# Patient Record
Sex: MALE | Race: WHITE | NOT HISPANIC OR LATINO | Employment: FULL TIME | URBAN - METROPOLITAN AREA
[De-identification: names, ages, dates, MRNs, and addresses within clinical notes are randomized per-mention and may not be internally consistent; named-entity substitution may affect disease eponyms.]

---

## 2021-05-26 ENCOUNTER — RECORDS - HEALTHEAST (OUTPATIENT)
Dept: ADMINISTRATIVE | Facility: CLINIC | Age: 26
End: 2021-05-26

## 2021-05-31 ENCOUNTER — RECORDS - HEALTHEAST (OUTPATIENT)
Dept: ADMINISTRATIVE | Facility: CLINIC | Age: 26
End: 2021-05-31

## 2024-04-08 ENCOUNTER — NURSE TRIAGE (OUTPATIENT)
Dept: FAMILY MEDICINE | Facility: CLINIC | Age: 29
End: 2024-04-08
Payer: COMMERCIAL

## 2024-04-08 NOTE — TELEPHONE ENCOUNTER
"Patient called reporting he has an appointment scheduled at the clinic on 4/29/24 with Dr. Lucas for establish care.  Calling with concerns of sudden onset of sweating \"out of control\" for about 5-10 minutes, then blurred vision in both eyes, and \"spaced out for about 30 seconds and didn't know what was going on, completely out of it this morning ~ 5:45am; co-workers were trying to see if I was okay.\"  Symptoms subsided \"after I came to my senses, vision was fine, stopped sweating.\"  Hadn't started his work shift yet, starts at 6am.    Has not had anything like this before.  Reports a couple years ago when he had some blurred peripheral vision but no other symptoms.      States he was seen at the clinic at his work, had vitals checked but did not see a provider as they weren't in yet.  His BP was 155/80.  , no hx of DM.    Hadn't eaten since last night, this morning had coffee and water.     States he's not seen a doctor in about 4 years.    During episode, denied chest pain, SOB, HA.  No injuries.    Was able to work all day, , no change in work duties and no concerns currently as no symptoms.      RN reviewed care advice and advised if symptoms return to be seen in the ER.  Appointment scheduled for patient to be seen with Domenica Lozano CNP for acute visit on 4/11/24.  Patient verbalized understanding and agrees to be seen in the ER if symptoms return.      Re Castellanos RN  [facility]       Reason for Disposition   Brief (now gone) blurred vision and unexplained   [1] SEVERE sweating (e.g., drenched with sweat) AND [2] cause unknown    Additional Information   Negative: Weakness of the face, arm or leg on one side of the body   Negative: Followed getting substance in the eye   Negative: Foreign body stuck in the eye   Negative: Followed an eye injury   Negative: Followed sun lamp or sun exposure (UV keratitis)   Negative: Yellow or green discharge (pus) " in the eye   Negative: Pregnant   Negative: Complete loss of vision in one or both eyes   Negative: SEVERE eye pain   Negative: SEVERE headache   Negative: Double vision   Negative: Blurred vision or visual changes and present now and sudden onset or new (e.g., minutes, hours, days)  (Exception: Seeing floaters / black specks OR previously diagnosed migraine headaches with same symptoms.)   Negative: Patient sounds very sick or weak to the triager   Negative: Flashes of light  (Exception: Brief from pressing on the eyeball.)   Negative: Many floaters in the eye (Exception: Floater(s) are a chronic symptom and this is unchanged from patient's baseline pattern.)   Negative: Eye pain and brief (now gone) blurred vision or visual changes     No eye pain   Negative: Taking digoxin (e.g., Lanoxin, Digitek, Cardoxin, Lanoxicaps; Toloxin in [place]) and blurred vision, yellow vision, or yellow-green halos   Negative: Jaw pain while eating and age > 50 years   Negative: Headache and age > 50 years   Negative: Patient wants to be seen   Negative: Single floater (i.e., small speck seems to float across the eye)  (Exception: Floater(s) are a chronic symptom and this is unchanged from patient's baseline pattern.)   Negative: SEVERE difficulty breathing (e.g., struggling for each breath, speaks in single words)   Negative: Shock suspected (e.g., cold/pale/clammy skin, too weak to stand, low BP, rapid pulse)   Negative: Sounds like a life-threatening emergency to the triager   Negative: Fever   Negative: Chest pain or cardiac ischemia is suspected (e.g., unexplained visible sweat on face)   Negative: Weakness   Negative: Dizziness and lightheadedness (e.g., feeling woozy, feeling like they might faint)   Negative: Heat exhaustion suspected (i.e., dehydration from heat exposure)   Negative: [1] Diabetes mellitus AND [2] sweating from low blood glucose (i.e., 70 mg/dl [3.9 mmol/l] or below)   Negative: Difficulty breathing    Negative: Patient sounds very sick or weak to the triager    Protocols used: Vision Loss or Change-A-OH, [id number]-Y-AW

## 2024-04-11 ENCOUNTER — OFFICE VISIT (OUTPATIENT)
Dept: FAMILY MEDICINE | Facility: CLINIC | Age: 29
End: 2024-04-11
Payer: COMMERCIAL

## 2024-04-11 VITALS
HEART RATE: 81 BPM | SYSTOLIC BLOOD PRESSURE: 132 MMHG | RESPIRATION RATE: 16 BRPM | HEIGHT: 67 IN | OXYGEN SATURATION: 98 % | DIASTOLIC BLOOD PRESSURE: 83 MMHG | BODY MASS INDEX: 25.64 KG/M2 | TEMPERATURE: 99.2 F | WEIGHT: 163.38 LBS

## 2024-04-11 DIAGNOSIS — G45.4 TRANSIENT GLOBAL AMNESIA: Primary | ICD-10-CM

## 2024-04-11 LAB
ALBUMIN SERPL BCG-MCNC: 4.5 G/DL (ref 3.5–5.2)
ALP SERPL-CCNC: 36 U/L (ref 40–150)
ALT SERPL W P-5'-P-CCNC: 28 U/L (ref 0–70)
ANION GAP SERPL CALCULATED.3IONS-SCNC: 9 MMOL/L (ref 7–15)
AST SERPL W P-5'-P-CCNC: 20 U/L (ref 0–45)
BILIRUB SERPL-MCNC: 0.3 MG/DL
BUN SERPL-MCNC: 12.3 MG/DL (ref 6–20)
CALCIUM SERPL-MCNC: 9.2 MG/DL (ref 8.6–10)
CHLORIDE SERPL-SCNC: 107 MMOL/L (ref 98–107)
CREAT SERPL-MCNC: 0.8 MG/DL (ref 0.67–1.17)
DEPRECATED HCO3 PLAS-SCNC: 25 MMOL/L (ref 22–29)
EGFRCR SERPLBLD CKD-EPI 2021: >90 ML/MIN/1.73M2
ERYTHROCYTE [DISTWIDTH] IN BLOOD BY AUTOMATED COUNT: 13.5 % (ref 10–15)
GLUCOSE SERPL-MCNC: 100 MG/DL (ref 70–99)
HCT VFR BLD AUTO: 41.3 % (ref 40–53)
HGB BLD-MCNC: 13.7 G/DL (ref 13.3–17.7)
MCH RBC QN AUTO: 28.9 PG (ref 26.5–33)
MCHC RBC AUTO-ENTMCNC: 33.2 G/DL (ref 31.5–36.5)
MCV RBC AUTO: 87 FL (ref 78–100)
PLATELET # BLD AUTO: 205 10E3/UL (ref 150–450)
POTASSIUM SERPL-SCNC: 4.4 MMOL/L (ref 3.4–5.3)
PROT SERPL-MCNC: 6.8 G/DL (ref 6.4–8.3)
RBC # BLD AUTO: 4.74 10E6/UL (ref 4.4–5.9)
SODIUM SERPL-SCNC: 141 MMOL/L (ref 135–145)
TSH SERPL DL<=0.005 MIU/L-ACNC: 1.26 UIU/ML (ref 0.3–4.2)
WBC # BLD AUTO: 4.7 10E3/UL (ref 4–11)

## 2024-04-11 PROCEDURE — 99204 OFFICE O/P NEW MOD 45 MIN: CPT

## 2024-04-11 PROCEDURE — 80053 COMPREHEN METABOLIC PANEL: CPT

## 2024-04-11 PROCEDURE — 36415 COLL VENOUS BLD VENIPUNCTURE: CPT

## 2024-04-11 PROCEDURE — 85027 COMPLETE CBC AUTOMATED: CPT

## 2024-04-11 PROCEDURE — 84443 ASSAY THYROID STIM HORMONE: CPT

## 2024-04-11 NOTE — ASSESSMENT & PLAN NOTE
Patient presents today to clinic to discuss a brief incident of what sounds like transient global amnesia while at work earlier this week.  He is entirely neurologically intact in clinic today. He has no significant medical history and is on no chronic medications.  He has no family history of things such as diabetes or seizure disorder.  He denies the presence of febrile seizures as a child.  His description of the event is a 30 to 60-second event of amnesia that was preceded by autonomic symptoms such as sweating. Did not eat that morning and had drank ETOH the day prior, but both of these things are fairly common for him. He does endorse a short postictal period of a few seconds after regaining consciousness and his blood sugar was within normal range within 20 minutes of the event.  He did not fall or pass out and was standing during the entirety of the event. Because this is the first experience of this kind, we discussed it is difficult to establish a concrete diagnosis but that my leading suspicion would be a seizure or a unique vasovagal response. We will obtain basic labs today. I have placed a referral to neurology for additional evaluation. Could consider e-consult if wait time is significant. I have emphasized that if he has a recurrence of these symptoms, I would recommend implementing strict seizure precautions including no driving or operating heavy machinery. He does have an appointment in two weeks to establish care with one of my partners which will serve as good follow up. Patient expressed understanding of and agreement with this plan. All questions were answered.

## 2024-04-11 NOTE — PROGRESS NOTES
Assessment & Plan   Problem List Items Addressed This Visit       Transient global amnesia - Primary     Patient presents today to clinic to discuss a brief incident of what sounds like transient global amnesia while at work earlier this week.  He is entirely neurologically intact in clinic today. He has no significant medical history and is on no chronic medications.  He has no family history of things such as diabetes or seizure disorder.  He denies the presence of febrile seizures as a child.  His description of the event is a 30 to 60-second event of amnesia that was preceded by autonomic symptoms such as sweating. Did not eat that morning and had drank ETOH the day prior, but both of these things are fairly common for him. He does endorse a short postictal period of a few seconds after regaining consciousness and his blood sugar was within normal range within 20 minutes of the event.  He did not fall or pass out and was standing during the entirety of the event. Because this is the first experience of this kind, we discussed it is difficult to establish a concrete diagnosis but that my leading suspicion would be a seizure or a unique vasovagal response. We will obtain basic labs today. I have placed a referral to neurology for additional evaluation. Could consider e-consult if wait time is significant. I have emphasized that if he has a recurrence of these symptoms, I would recommend implementing strict seizure precautions including no driving or operating heavy machinery. He does have an appointment in two weeks to establish care with one of my partners which will serve as good follow up. Patient expressed understanding of and agreement with this plan. All questions were answered.         Relevant Orders    Adult Neurology  Referral    Comprehensive metabolic panel (BMP + Alb, Alk Phos, ALT, AST, Total. Bili, TP)    CBC with platelets    TSH with free T4 reflex      Vimal Waller is a 29 year  "old, presenting for the following health issues:  Consult (Episode Monday-Sweaty, blurred vision and loss of memory for one minute-co workers say he was unresponsive, dropped work belt and didn't remember. Rest of day was fine and continued to work. No more episodes since.) and Establish Care      4/11/2024    10:33 AM   Additional Questions   Roomed by sac   Accompanied by self         4/11/2024    10:33 AM   Patient Reported Additional Medications   Patient reports taking the following new medications no     Episode of short term amnesia  Had a 30-60 amnesic event preceded by sweating and blurry vision. He was at work at the time, coworkers confirmed the length of time. They were unable to get his attention at that time. He was standing - did not pass out or faint. He had a short period of feeling 'out of it' afterwards but then had no recurrence of symptoms. He worked the remainder of the day. Did not eat breakfast before work, but usually does not.   He denies any history of similar symptoms.   Was drinking the day before but notes that this is fairly common for him.   New to system but no chronic health conditions, not on any medications.   No personal or family history of things such as diabetes, seizure (febrile or other)  He had blood sugar checked by work immediately after and it as 108 - about 15-20 minutes after the episode    History of Present Illness       Reason for visit:  Unexplained vision change  Symptom onset:  1-3 days ago    He eats 0-1 servings of fruits and vegetables daily.He consumes 0 sweetened beverage(s) daily.He exercises with enough effort to increase his heart rate 30 to 60 minutes per day.  He exercises with enough effort to increase his heart rate 4 days per week.   He is taking medications regularly.      Objective    Ht 1.709 m (5' 7.3\")   Wt 74.1 kg (163 lb 6 oz)   BMI 25.36 kg/m    Body mass index is 25.36 kg/m .    Physical Exam  Vitals and nursing note reviewed. "   Constitutional:       General: He is not in acute distress.     Appearance: Normal appearance.   Cardiovascular:      Rate and Rhythm: Normal rate and regular rhythm.      Pulses: Normal pulses.   Pulmonary:      Effort: Pulmonary effort is normal. No respiratory distress.   Musculoskeletal:         General: Normal range of motion.      Comments: Strength 5/5 BLE and BUE   Skin:     General: Skin is warm.      Coloration: Skin is not pale.   Neurological:      General: No focal deficit present.      Mental Status: He is alert and oriented to person, place, and time.      Cranial Nerves: No cranial nerve deficit.      Sensory: No sensory deficit.      Motor: No weakness.      Coordination: Coordination normal.      Gait: Gait normal.      Deep Tendon Reflexes: Reflexes normal.   Psychiatric:         Mood and Affect: Mood normal.         Behavior: Behavior normal.         Thought Content: Thought content normal.          Signed Electronically by: FRANCO Arshad CNP

## 2024-04-16 ENCOUNTER — TELEPHONE (OUTPATIENT)
Dept: NEUROLOGY | Facility: CLINIC | Age: 29
End: 2024-04-16
Payer: COMMERCIAL

## 2024-04-16 NOTE — TELEPHONE ENCOUNTER
Called waitlist patient and offered an appointment with Dr. Sanchez on this date 6/14/2024 & time 3:30 p.m. at this location [facility].     Please note there is no guarantee this appointment will be available as it is first come first serve.    Left a voicemail.    Lisha AGUILERA/Complex Procedure    [facility]   Neurology, NeuroSurgery, NeuroPsychology, Pain Management and Cardiology Specialties  Medical/Surgical Adult Specialties

## 2024-05-11 ENCOUNTER — HEALTH MAINTENANCE LETTER (OUTPATIENT)
Age: 29
End: 2024-05-11

## 2024-07-11 NOTE — TELEPHONE ENCOUNTER
REASON FOR VISIT: Transient global amnesia    DATE OF APPT: 7/12/2024   NOTES (FOR ALL VISITS) STATUS DETAILS   OFFICE NOTE from referring provider Internal Domenica GAMEZ CNP  04/11/2024   MEDICATION LIST Internal    IMAGING  (FOR ALL VISITS)     MRI (HEAD, NECK, SPINE) N/A    CT (HEAD, NECK, SPINE) N/A

## 2024-07-12 ENCOUNTER — PRE VISIT (OUTPATIENT)
Dept: NEUROLOGY | Facility: CLINIC | Age: 29
End: 2024-07-12

## 2025-05-17 ENCOUNTER — HEALTH MAINTENANCE LETTER (OUTPATIENT)
Age: 30
End: 2025-05-17